# Patient Record
Sex: FEMALE | Race: OTHER | HISPANIC OR LATINO | Employment: OTHER | URBAN - METROPOLITAN AREA
[De-identification: names, ages, dates, MRNs, and addresses within clinical notes are randomized per-mention and may not be internally consistent; named-entity substitution may affect disease eponyms.]

---

## 2023-05-11 ENCOUNTER — OFFICE VISIT (OUTPATIENT)
Dept: SPORTS MEDICINE | Facility: CLINIC | Age: 78
End: 2023-05-11
Payer: MEDICARE

## 2023-05-11 ENCOUNTER — HOSPITAL ENCOUNTER (OUTPATIENT)
Dept: RADIOLOGY | Facility: HOSPITAL | Age: 78
Discharge: HOME | End: 2023-05-11
Attending: ORTHOPAEDIC SURGERY
Payer: MEDICARE

## 2023-05-11 VITALS
BODY MASS INDEX: 26.58 KG/M2 | SYSTOLIC BLOOD PRESSURE: 157 MMHG | DIASTOLIC BLOOD PRESSURE: 74 MMHG | HEIGHT: 63 IN | WEIGHT: 150 LBS | HEART RATE: 62 BPM

## 2023-05-11 DIAGNOSIS — M25.569 KNEE PAIN, UNSPECIFIED CHRONICITY, UNSPECIFIED LATERALITY: ICD-10-CM

## 2023-05-11 DIAGNOSIS — M25.569 KNEE PAIN, UNSPECIFIED CHRONICITY, UNSPECIFIED LATERALITY: Primary | ICD-10-CM

## 2023-05-11 DIAGNOSIS — M17.11 LOCALIZED PRIMARY OSTEOARTHRITIS OF RIGHT LOWER LEG: ICD-10-CM

## 2023-05-11 PROCEDURE — 99999 PR PBB SHADOW E&M-NEW PATIENT-LVL III: ICD-10-PCS | Mod: PBBFAC,,, | Performed by: ORTHOPAEDIC SURGERY

## 2023-05-11 PROCEDURE — 73564 X-RAY EXAM KNEE 4 OR MORE: CPT | Mod: TC,50

## 2023-05-11 PROCEDURE — 73564 XR KNEE ORTHO BILAT WITH FLEXION: ICD-10-PCS | Mod: 26,RT,, | Performed by: RADIOLOGY

## 2023-05-11 PROCEDURE — 3077F SYST BP >= 140 MM HG: CPT | Mod: CPTII,S$GLB,, | Performed by: ORTHOPAEDIC SURGERY

## 2023-05-11 PROCEDURE — 99999 PR PBB SHADOW E&M-NEW PATIENT-LVL III: CPT | Mod: PBBFAC,,, | Performed by: ORTHOPAEDIC SURGERY

## 2023-05-11 PROCEDURE — 1125F AMNT PAIN NOTED PAIN PRSNT: CPT | Mod: CPTII,S$GLB,, | Performed by: ORTHOPAEDIC SURGERY

## 2023-05-11 PROCEDURE — 1125F PR PAIN SEVERITY QUANTIFIED, PAIN PRESENT: ICD-10-PCS | Mod: CPTII,S$GLB,, | Performed by: ORTHOPAEDIC SURGERY

## 2023-05-11 PROCEDURE — 3077F PR MOST RECENT SYSTOLIC BLOOD PRESSURE >= 140 MM HG: ICD-10-PCS | Mod: CPTII,S$GLB,, | Performed by: ORTHOPAEDIC SURGERY

## 2023-05-11 PROCEDURE — 73564 X-RAY EXAM KNEE 4 OR MORE: CPT | Mod: 26,LT,, | Performed by: RADIOLOGY

## 2023-05-11 PROCEDURE — 3288F PR FALLS RISK ASSESSMENT DOCUMENTED: ICD-10-PCS | Mod: CPTII,S$GLB,, | Performed by: ORTHOPAEDIC SURGERY

## 2023-05-11 PROCEDURE — 1101F PR PT FALLS ASSESS DOC 0-1 FALLS W/OUT INJ PAST YR: ICD-10-PCS | Mod: CPTII,S$GLB,, | Performed by: ORTHOPAEDIC SURGERY

## 2023-05-11 PROCEDURE — 99203 OFFICE O/P NEW LOW 30 MIN: CPT | Mod: S$GLB,,, | Performed by: ORTHOPAEDIC SURGERY

## 2023-05-11 PROCEDURE — 3078F DIAST BP <80 MM HG: CPT | Mod: CPTII,S$GLB,, | Performed by: ORTHOPAEDIC SURGERY

## 2023-05-11 PROCEDURE — 1159F MED LIST DOCD IN RCRD: CPT | Mod: CPTII,S$GLB,, | Performed by: ORTHOPAEDIC SURGERY

## 2023-05-11 PROCEDURE — 3288F FALL RISK ASSESSMENT DOCD: CPT | Mod: CPTII,S$GLB,, | Performed by: ORTHOPAEDIC SURGERY

## 2023-05-11 PROCEDURE — 1159F PR MEDICATION LIST DOCUMENTED IN MEDICAL RECORD: ICD-10-PCS | Mod: CPTII,S$GLB,, | Performed by: ORTHOPAEDIC SURGERY

## 2023-05-11 PROCEDURE — 99203 PR OFFICE/OUTPT VISIT, NEW, LEVL III, 30-44 MIN: ICD-10-PCS | Mod: S$GLB,,, | Performed by: ORTHOPAEDIC SURGERY

## 2023-05-11 PROCEDURE — 1101F PT FALLS ASSESS-DOCD LE1/YR: CPT | Mod: CPTII,S$GLB,, | Performed by: ORTHOPAEDIC SURGERY

## 2023-05-11 PROCEDURE — 3078F PR MOST RECENT DIASTOLIC BLOOD PRESSURE < 80 MM HG: ICD-10-PCS | Mod: CPTII,S$GLB,, | Performed by: ORTHOPAEDIC SURGERY

## 2023-05-11 PROCEDURE — 73564 X-RAY EXAM KNEE 4 OR MORE: CPT | Mod: 26,RT,, | Performed by: RADIOLOGY

## 2023-05-11 RX ORDER — AMLODIPINE BESYLATE 10 MG/1
10 TABLET ORAL DAILY
COMMUNITY

## 2023-05-11 RX ORDER — ROSUVASTATIN CALCIUM 5 MG/1
5 TABLET, COATED ORAL DAILY
COMMUNITY

## 2023-05-11 RX ORDER — SPIRONOLACTONE 25 MG/1
25 TABLET ORAL DAILY
COMMUNITY

## 2023-05-11 RX ORDER — METOPROLOL SUCCINATE 25 MG/1
25 TABLET, EXTENDED RELEASE ORAL DAILY
COMMUNITY

## 2023-05-11 RX ORDER — ASPIRIN 81 MG/1
81 TABLET ORAL DAILY
COMMUNITY

## 2023-05-11 RX ORDER — LOSARTAN POTASSIUM 100 MG/1
100 TABLET ORAL DAILY
COMMUNITY

## 2023-05-11 NOTE — PROGRESS NOTES
CC: Right knee pain    77 y.o. Female with a history of right knee pain who has had pain for many years. No recent injury but she notes acute worsening of her pain. She has never had surgery or injections in the right knee.  She states that the pain is severe and not responding to any conservative care.     She reports that the pain and weakness. It also bothers her at night.    No mechanical symptoms, No instability    Is affecting ADLs.  Pain is 10/10 at it's worst.    REVIEW OF SYSTEMS:   Constitution: Negative. Negative for chills, fever and night sweats.   HENT: Negative for congestion and headaches.    Eyes: Negative for blurred vision, left vision loss and right vision loss.   Cardiovascular: Negative for chest pain and syncope.   Respiratory: Negative for cough and shortness of breath.    Endocrine: Negative for polydipsia, polyphagia and polyuria.   Hematologic/Lymphatic: Negative for bleeding problem. Does not bruise/bleed easily.   Skin: Negative for dry skin, itching and rash.   Musculoskeletal: Negative for falls. Positive for right knee pain and  muscle weakness.   Gastrointestinal: Negative for abdominal pain and bowel incontinence.   Genitourinary: Negative for bladder incontinence and nocturia.   Neurological: Negative for disturbances in coordination, loss of balance and seizures.   Psychiatric/Behavioral: Negative for depression. The patient does not have insomnia.    Allergic/Immunologic: Negative for hives and persistent infections.     PAST MEDICAL HISTORY:   Past Medical History:   Diagnosis Date    Hyperlipidemia     Hypertension     Myocardial infarction        PAST SURGICAL HISTORY:   Past Surgical History:   Procedure Laterality Date    CARDIAC SURGERY N/A 04/2021    Stent       FAMILY HISTORY:   History reviewed. No pertinent family history.    SOCIAL HISTORY:   Social History     Socioeconomic History    Marital status:    Tobacco Use    Smoking status: Never    Smokeless tobacco:  "Never       MEDICATIONS:     Current Outpatient Medications:     amLODIPine (NORVASC) 10 MG tablet, Take 10 mg by mouth once daily., Disp: , Rfl:     aspirin (ECOTRIN) 81 MG EC tablet, Take 81 mg by mouth once daily., Disp: , Rfl:     losartan (COZAAR) 100 MG tablet, Take 100 mg by mouth once daily., Disp: , Rfl:     metoprolol succinate (TOPROL-XL) 25 MG 24 hr tablet, Take 25 mg by mouth once daily., Disp: , Rfl:     rosuvastatin (CRESTOR) 5 MG tablet, Take 5 mg by mouth once daily., Disp: , Rfl:     spironolactone (ALDACTONE) 25 MG tablet, Take 25 mg by mouth once daily., Disp: , Rfl:     ALLERGIES:   Review of patient's allergies indicates:  No Known Allergies    VITAL SIGNS:   BP (!) 157/74   Pulse 62   Ht 5' 3" (1.6 m)   Wt 68 kg (150 lb)   BMI 26.57 kg/m²      PHYSICAL EXAMINATION:  General:  The patient is alert and oriented x 3.  Mood is pleasant.  Observation of ears, eyes and nose reveal no gross abnormalities.  No labored breathing observed.    RIGHT KNEE EXAMINATION     OBSERVATION / INSPECTION   Gait:   antalgic   Alignment:  valgus   Scars:   None   Muscle atrophy: Mild  Effusion:  None   Warmth:  None   Discoloration:   none     TENDERNESS / CREPITUS (T / C):          T / C      T / C   Patella   - / -   Lateral joint line   + / -   Peripatellar medial  -  Medial joint line    + / -   Peripatellar lateral -  Medial plica   - / -   Patellar tendon -   Popliteal fossa  - / -   Quad tendon   -   Gastrocnemius   -   Prepatellar Bursa - / -   Quadricep   -   Tibial tubercle  -  Thigh/hamstring  -   Pes anserine/HS -  Fibula    -   ITB   - / -  Tibia     -   Tib/fib joint  - / -  LCL    -     MFC   - / -   MCL: Proximal  -    LFC   - / -    Distal   -          ROM: (* = pain)  PASSIVE   ACTIVE    Left :   5 / 0 / 145   5 / 0 / 145     Right :    5 / 0 / 90   5 / 0 / 90    Patellofemoral examination:  See above noted areas of tenderness.   Patella position    Subluxation / dislocation: Centered "           Sup. / Inf;   Normal   Crepitus (PF):    Absent   Patellar Mobility:       Medial-lateral:   Normal    Superior-inferior:  Normal    Inferior tilt   Normal    Patellar tendon:  Normal   Lateral tilt:    Normal   J-sign:     None   Patellofemoral grind:   No pain       MENISCAL SIGNS:     Pain on terminal extension:  -  Pain on terminal flexion:  -  Lisas maneuver:  - (for pain)  Squat     - (for pain)    LIGAMENT EXAMINATION:  ACL / Lachman:  normal (-1 to 2mm)    PCL-Post.  drawer: normal 0 to 2mm  MCL- Valgus:  normal 0 to 2mm  LCL- Varus:  normal 0 to 2mm  Pivot shift: normal (Equal)   Dial Test: difference c/w other side   At 30° flexion: normal (< 5°)    At 90° flexion: normal (< 5°)   Reverse Pivot Shift:   normal (Equal)     STRENGTH: (* = with pain) PAINFUL SIDE   Quadricep   5/5   Hamstrin/5    EXTREMITY NEURO-VASCULAR EXAMINATION:   Sensation:  Grossly intact to light touch all dermatomal regions.   Motor Function:  Fully intact motor function at hip, knee, foot and ankle    DTRs;  quadriceps and  achilles 2+.  No clonus and downgoing Babinski.    Vascular status:  DP and PT pulses 2+, brisk capillary refill, symmetric.     OTHER FINDINGS:      IMAGING:    X-rays including standing, weight bearing AP and flexion bilateral knees, lateral and merchant views ordered and images reviewed by me show:    No fracture, dislocation or other pathology   Medial compartment: no degenerative changes   Lateral compartment: no degenerative changes   Patellofemoral compartment: no degenerative changes        ASSESSMENT:    Right Knee Pain, Primary OA    PLAN:   1. Referral to Dr. Wang for TKA        All questions were answered, pt will contact us for questions or concerns in the interim.

## 2023-05-24 ENCOUNTER — TELEPHONE (OUTPATIENT)
Dept: SPORTS MEDICINE | Facility: CLINIC | Age: 78
End: 2023-05-24
Payer: MEDICARE

## 2023-05-24 NOTE — TELEPHONE ENCOUNTER
----- Message from ZAK Wang MD sent at 5/24/2023  2:51 PM CDT -----  Patient no showed her visit with me last Thursday.  New patient to me.  Please contact to r/s if she wishes.     SC